# Patient Record
Sex: FEMALE
[De-identification: names, ages, dates, MRNs, and addresses within clinical notes are randomized per-mention and may not be internally consistent; named-entity substitution may affect disease eponyms.]

---

## 2018-08-22 ENCOUNTER — HOSPITAL ENCOUNTER (EMERGENCY)
Dept: HOSPITAL 31 - C.EROB | Age: 28
Discharge: HOME | End: 2018-08-22
Payer: COMMERCIAL

## 2018-08-22 VITALS — BODY MASS INDEX: 29.2 KG/M2

## 2018-08-22 DIAGNOSIS — R10.30: ICD-10-CM

## 2018-08-22 DIAGNOSIS — O26.893: Primary | ICD-10-CM

## 2018-08-22 DIAGNOSIS — Z3A.31: ICD-10-CM

## 2018-08-22 LAB
BILIRUB UR-MCNC: NEGATIVE MG/DL
COLOR UR: (no result)
GLUCOSE UR STRIP-MCNC: NEGATIVE MG/DL
LEUKOCYTE ESTERASE UR-ACNC: NEGATIVE LEU/UL
PH UR STRIP: 6.5 [PH] (ref 5–8)
PROT UR STRIP-MCNC: NEGATIVE MG/DL
RBC # UR STRIP: NEGATIVE /UL
SP GR UR STRIP: < 1.005 (ref 1–1.03)
UROBILINOGEN UR-MCNC: 0.2 MG/DL (ref 0.2–1)

## 2018-08-22 NOTE — OBHP
===========================

Datetime: 2018 13:04

===========================

   

IP Adm Impression:  , intrauterine pregnancy

IP Admit Plan:  Observation/Evaluation

Admit Comment, IP Provider:  27 yo female  with an IUP at 31.4 weeks

   C/O of Suprapubic pains while at work on her feet. Denies Urinary symptoms

   Drinks plenty of water. 

      

Pelvic Type - PN:  Adequate

Extremities - PN:  Normal

Abdomen - PN:  Normal

Back - PN:  Normal

Breast - PN:  Not Done

Lungs - PN:  Normal

Heart - PN:  Normal

Thyroid - PN:  Normal

Neurologic - PN:  Normal

HEENT - PN:  Normal

General - PN:  Normal

Fetal Presentation-Admit:  Vertex

FHR - Baseline A Provider:  130

Membranes, Provider:  Intact

Contraction Comments Provider:  None

Gestation - Est Wks by US:  31.4

EGA AdmitDate IP:  31.4

Vital Signs Provider:  Reviewed

IP Chief Complaint:  Signs/symptoms UTI; Maternal discomfort; Fetal evaluation

NICHD Variability Prov Fetus A:  Moderate 6-25bpm

NICHD Accel Fetus A IP Provider:  10X10

FHR Category Provider Fetus A:  Category I

NICHD Decel Fetus A IP Provider:  None

Dilatation, Provider:  0

Effacement, Provider:  0

Station, Provider:  -3

Genitourinary Exam:  Normal

DTRs - PN:  Normal

## 2018-08-23 VITALS — HEART RATE: 89 BPM | DIASTOLIC BLOOD PRESSURE: 64 MMHG | SYSTOLIC BLOOD PRESSURE: 97 MMHG

## 2018-11-08 ENCOUNTER — HOSPITAL ENCOUNTER (INPATIENT)
Dept: HOSPITAL 31 - C.EROB | Age: 28
LOS: 2 days | Discharge: HOME | DRG: 560 | End: 2018-11-10
Attending: OBSTETRICS & GYNECOLOGY | Admitting: OBSTETRICS & GYNECOLOGY
Payer: MEDICAID

## 2018-11-08 VITALS — BODY MASS INDEX: 29.2 KG/M2

## 2018-11-08 DIAGNOSIS — O48.0: Primary | ICD-10-CM

## 2018-11-08 DIAGNOSIS — D64.9: ICD-10-CM

## 2018-11-08 DIAGNOSIS — O36.63X0: ICD-10-CM

## 2018-11-08 DIAGNOSIS — Z3A.41: ICD-10-CM

## 2018-11-08 DIAGNOSIS — B15.9: ICD-10-CM

## 2018-11-08 LAB
ALBUMIN SERPL-MCNC: 3 G/DL (ref 3.5–5)
ALBUMIN/GLOB SERPL: 1 {RATIO} (ref 1–2.1)
ALT SERPL-CCNC: 35 U/L (ref 9–52)
AST SERPL-CCNC: 31 U/L (ref 14–36)
BACTERIA #/AREA URNS HPF: (no result) /[HPF]
BASOPHILS # BLD AUTO: 0 K/UL (ref 0–0.2)
BASOPHILS NFR BLD: 0.5 % (ref 0–2)
BILIRUB UR-MCNC: NEGATIVE MG/DL
BUN SERPL-MCNC: 8 MG/DL (ref 7–17)
CALCIUM SERPL-MCNC: 8.1 MG/DL (ref 8.6–10.4)
EOSINOPHIL # BLD AUTO: 0.1 K/UL (ref 0–0.7)
EOSINOPHIL NFR BLD: 2.5 % (ref 0–4)
ERYTHROCYTE [DISTWIDTH] IN BLOOD BY AUTOMATED COUNT: 15 % (ref 11.5–14.5)
GFR NON-AFRICAN AMERICAN: > 60
GLUCOSE UR STRIP-MCNC: NORMAL MG/DL
HBV SURFACE AG SERPL QL CFM: (no result)
HEPATITIS A IGM: NEGATIVE
HEPATITIS B CORE AB: NEGATIVE
HEPATITIS C ANTIBODY: NEGATIVE
HGB BLD-MCNC: 11.2 G/DL (ref 11–16)
LEUKOCYTE ESTERASE UR-ACNC: (no result) LEU/UL
LYMPHOCYTES # BLD AUTO: 1.1 K/UL (ref 1–4.3)
LYMPHOCYTES NFR BLD AUTO: 25.6 % (ref 20–40)
MCH RBC QN AUTO: 30.9 PG (ref 27–31)
MCHC RBC AUTO-ENTMCNC: 34.4 G/DL (ref 33–37)
MCV RBC AUTO: 89.9 FL (ref 81–99)
MONOCYTES # BLD: 0.2 K/UL (ref 0–0.8)
MONOCYTES NFR BLD: 5.6 % (ref 0–10)
NEUTROPHILS # BLD: 2.9 K/UL (ref 1.8–7)
NEUTROPHILS NFR BLD AUTO: 65.8 % (ref 50–75)
NRBC BLD AUTO-RTO: 0 % (ref 0–2)
PH UR STRIP: 6 [PH] (ref 5–8)
PLATELET # BLD: 121 K/UL (ref 130–400)
PMV BLD AUTO: 10.5 FL (ref 7.2–11.7)
PROT UR STRIP-MCNC: NEGATIVE MG/DL
RBC # BLD AUTO: 3.63 MIL/UL (ref 3.8–5.2)
RBC # UR STRIP: (no result) /UL
SP GR UR STRIP: 1.01 (ref 1–1.03)
SQUAMOUS EPITHIAL: 32 /HPF (ref 0–5)
UROBILINOGEN UR-MCNC: 4 MG/DL (ref 0.2–1)
WBC # BLD AUTO: 4.4 K/UL (ref 4.8–10.8)

## 2018-11-08 PROCEDURE — 3E0P7VZ INTRODUCTION OF HORMONE INTO FEMALE REPRODUCTIVE, VIA NATURAL OR ARTIFICIAL OPENING: ICD-10-PCS | Performed by: OBSTETRICS & GYNECOLOGY

## 2018-11-08 PROCEDURE — 10907ZC DRAINAGE OF AMNIOTIC FLUID, THERAPEUTIC FROM PRODUCTS OF CONCEPTION, VIA NATURAL OR ARTIFICIAL OPENING: ICD-10-PCS | Performed by: OBSTETRICS & GYNECOLOGY

## 2018-11-08 NOTE — OBADHP
===========================

Datetime: 2018 10:51

===========================

   

Admit Comment, IP Provider:  27 yo female  with an IUP at 41.2 weeks and admitted for IOL for
 postdates. Denies LOF, VB or VD and admits to adequate FM

   Seen at [facility] for weekly BPP's for LGA and postdates. [facility] US for Growth on 10/23 with EFW 9.2 lbs an
d normal JOHANNE/ BPP / on , per report

      

   PMHx + Hep B and A, per prenatals and Varicella non-Immune

   PSHx Denies

   Meds PNV

   NKDA

   Social Hx Negative x 3

   Past OB Hx: 2 's without complications. Last one w 8lbs 9oz at [facility]

      

   A/P

   Posterm pregnancy

   Admitted for IOL

   LGA

   Will admit and start Pitocin for IOL

   GBS Negtive

      

Pelvic Type - PN:  Adequate

Extremities - PN:  Normal

Abdomen - PN:  Normal

Back - PN:  Normal

Breast - PN:  Not Done

Lungs - PN:  Normal

Heart - PN:  Normal

Thyroid - PN:  Normal

Neurologic - PN:  Normal

HEENT - PN:  Normal

General - PN:  Normal

FHR - Baseline A Provider:  130

Membranes, Provider:  Intact

Contraction Comments Provider:  Irregular

Comments, ACOG Physical Exam:  FH 40 cms

      

Gestation - Est Wks by US:  41.2

IP Prenatal Hx Assessment:  The Prenatal History has been Reviewed and is Current

Vital Signs Provider:  Reviewed; Within Normal Limits

IP Chief Complaint:  Uterine contractions; Scheduled induction of labor

NICHD Variability Prov Fetus A:  Moderate 6-25bpm

NICHD Accel Fetus A IP Provider:  10X10

FHR Category Provider Fetus A:  Category I

NICHD Decel Fetus A IP Provider:  None

Dilatation, Provider:  3

Effacement, Provider:  50

Station, Provider:  -3

Genitourinary Exam:  Normal

DTRs - PN:  Normal

EGA AdmitDate IP:  41.2

IP Adm Impression:  Postterm, intrauterine pregnancy; Intact Membranes

IP Admit Plan:  Admit to unit; Initiate labor induction protocol

   

===========================

Datetime: 2018 08:20

===========================

   

Fetal Presentation-Admit:  Vertex

Pool Provider:  Negative

## 2018-11-08 NOTE — OBDS
===================================

DELIVERY PERSONNEL

===================================

   

Delivery Doctor:  BRANDI Mccracken DO

Circulator:  Jessica Sandoval RN

Anesthesiologist:  JOSE FRANCISCO Cleaning MD

   

===================================

MATERNAL INFORMATION

===================================

   

Delivery Anesthesia:  Epidural

Estimated  Blood Loss (ml):  300

Placenta Cultured:  No

Maternal Complications:  None

Provider Comments:   of viable male  from ZUHAIR positiion and over an intact perineum. Apgars
 9_9, BW 11.02 lbs, 4995 grams.

    mls

   Placenta and cord blood sent to lab

   Pt and  both tolerated the procedure well and remained in L_D in S_S condition

   

===================================

LABOR SUMMARY

===================================

   

EDC:  10/30/2018 00:00

No. Babies in Womb:  1

 Attempted:  No

Labor Anesthesia:  Epidural

   

===================================

LABOR INFORMATION

===================================

   

Reason for Induction:  Postterm

Onset of Labor:  2018 08:30

Complete Dilatation:  2018 19:55

Oxytocin:  Augmentation

Steroids Given:  None

Reason Steroids Not Administered:  Not Applicable

   

===================================

MEMBRANES

===================================

   

Membranes Rupture Method:  Artificial

Amniotic Fluid Color:  Clear

Amniotic Fluid Amount:  Moderate

   

===================================

STAGES OF LABOR

===================================

   

Stage 1 hrs:  11

Stage 1 min:  25

Stage 2 hrs:  0

Stage 2 min:  10

Stage 3 hrs:  0

Stage 3 min:  6

Total Time in Labor hrs:  11

Total Time in Labor min:  41

   

===================================

VAGINAL DELIVERY

===================================

   

Episiotomy:  None

Laceration Extension:  N/A

Laceration Type:  None

Laceration Repair:  Not Applicable

Sponge Count Correct:  Yes

Sharps Count Correct:  Yes

   

===================================

BABY A INFORMATION

===================================

   

Infant Delivery Date/Time:  2018 20:05

Method of Delivery:  Vaginal

Born in Route :  No

:  N/A

Forceps:  N/A

Vacuum Extraction:  N/A

Shoulder Dystocia :  No

   

===================================

SHOULDER DYSTOCIA BABY A

===================================

   

Infant Delivery Date/Time:  2018 20:05

   

===================================

PRESENTATION/POSITION BABY A

===================================

   

Presentation:  Cephalic

Cephalic Presentation:  Vertex

Vertex Position:  Right Occipital Anterior

Breech Presentation:  N/A

   

===================================

PLACENTA INFORMATION BABY A

===================================

   

Placenta Delivery Time :  2018 20:11

Placenta Method of Delivery:  Spontaneous

Placenta Status:  Delivered

   

===================================

APGAR SCORES BABY A

===================================

   

Heart Rate 1 min:  >100 bpm

Resp Effort 1 min:  Good Cry

Reflex Irritability 1 min:  Cough or Sneeze or Pulls Away

Muscle Tone 1 min:  Active Motion

Color 1 min:  Body Pink, Extremities Blue

Resuscitation Effort 1 min:  Tactile Stimulation

APGAR SCORE 1 MIN:  9

Heart Rate 5 min:  >100 bpm

Resp Effort 5 min:  Good Cry

Reflex Irritability 5 min:  Cough or Sneeze or Pulls Away

Muscle Tone 5 min:  Active Motion

Color 5 min:  Completely Pink

Resuscitation Effort 5 min:  N/A

APGAR SCORE 5 MIN:  10

   

===================================

INFANT INFORMATION BABY A

===================================

   

Gestational Age at Delivery:  41.2

Gestational Status:  Term

Infant Outcome :  Liveborn

Infant Condition :  STABLE

Infant Sex:  Male

   

===================================

IDENTIFICATION/MEDS BABY A

===================================

   

ID Band Number:  [id number]

ID Band Location:  Left Leg; Left Arm

Sensor Applied:  Yes

Sensor Location :  Cord Clamp

   

===================================

WEIGHT/LENGTH BABY A

===================================

   

Infant Birthweight (gms):  4995

Infant Weight (lb):  11

Infant Weight (oz):  0

Infant Length Inches:  22.00

Infant Length cms:  55.9

   

===================================

CORD INFORMATION BABY A

===================================

   

No. Cord Vessels:  3

Nuchal Cord :  N/A

## 2018-11-08 NOTE — OBPN
===========================

Datetime: 11/08/2018 15:48

===========================

   

IP Progress Impression:  Normal progression of labor; Reassuring fetal heart rate

IP Informed Consent Obtain:  Vaginal Delivery

IP Procedures:  Artificial ROM; Sterile Vag Exam

IP Progress Plan:  Continue present management; Induction

Membranes, Provider:  Ruptured

Amniotic Fluid Color, Provider:  Clear

Contraction Comments Provider:  2-3 mins

FHR - Baseline A Provider:  130

Gestation - Est Wks by US:  41.2

Fetal Presentation-Admit:  Vertex

IP Progress Note Comment:  Pt seen and examined

   Epidural in place and working well. Pt very comfortable

   AROM performed with return of clear fluid

   Will restart Ptocin

   All admit labs reviewed and Essentially WNL

   Anticipate a vaginal delivery

   Fetal tracing reassuring

Vital Signs Provider:  Reviewed; Within Normal Limits

NICHD Accel Fetus A IP Provider:  10X10

NICHD Variability Prov Fetus A:  Moderate 6-25bpm

Dilatation, Provider:  5

Effacement, Provider:  75

Station, Provider:  -3

NICHD Decel Fetus A IP Provider:  None

   

===========================

Datetime: 11/08/2018 10:51

===========================

   

FHR Category Provider Fetus A:  Category I

   

===========================

Datetime: 11/08/2018 08:20

===========================

   

Pool Provider:  Negative

## 2018-11-09 LAB
BASOPHILS # BLD AUTO: 0 K/UL (ref 0–0.2)
BASOPHILS NFR BLD: 0.4 % (ref 0–2)
EOSINOPHIL # BLD AUTO: 0 K/UL (ref 0–0.7)
EOSINOPHIL NFR BLD: 0.2 % (ref 0–4)
ERYTHROCYTE [DISTWIDTH] IN BLOOD BY AUTOMATED COUNT: 14.8 % (ref 11.5–14.5)
HGB BLD-MCNC: 11 G/DL (ref 11–16)
LYMPHOCYTES # BLD AUTO: 1.2 K/UL (ref 1–4.3)
LYMPHOCYTES NFR BLD AUTO: 10.6 % (ref 20–40)
MCH RBC QN AUTO: 30.5 PG (ref 27–31)
MCHC RBC AUTO-ENTMCNC: 34.1 G/DL (ref 33–37)
MCV RBC AUTO: 89.5 FL (ref 81–99)
MONOCYTES # BLD: 0.6 K/UL (ref 0–0.8)
MONOCYTES NFR BLD: 5.1 % (ref 0–10)
NEUTROPHILS # BLD: 9.7 K/UL (ref 1.8–7)
NEUTROPHILS NFR BLD AUTO: 83.7 % (ref 50–75)
NRBC BLD AUTO-RTO: 0 % (ref 0–2)
PLATELET # BLD: 120 K/UL (ref 130–400)
PMV BLD AUTO: 10.3 FL (ref 7.2–11.7)
RBC # BLD AUTO: 3.6 MIL/UL (ref 3.8–5.2)
WBC # BLD AUTO: 11.6 K/UL (ref 4.8–10.8)

## 2018-11-09 RX ADMIN — Medication SCH TAB: at 10:01

## 2018-11-09 NOTE — OBPPN
===========================

Datetime: 2018 07:20

===========================

   

PP Pain Prov:  Within normal limits

PP Nausea Prov:  Denies

PP Flatus Prov:  No

PP BM Prov:  No

PP Heart Prov:  Normal

PP Lungs Prov:  Normal

PP Abdomen/Uterus Prov:  Normal

PP Lochia Prov:  Normal

PP Vulva/Perineum Prov:  Normal

PP CVA Tenderness Prov:  Normal

PP Extremities Prov:  Normal

PP C/S Incision Prov:  Not Applicable

PP Breastfeeding Progress Prov:  Normal

PP Comments Phys Exam Prov:  Gen: patient in bed in NAD

   Heart: S1, S2, RRR

   Chest: CTA b/l. No wheezing

   Abd: soft, +BS, appropriate tender to palpate, surgical incision clean, no erythema, no swelling, 
no discahrge. Fundal height at one finger above the level of umbilicus

   Ext: peripheral pulses palpable, no edema, no cyanosis

PP Impression Prov:  Normal postpartum progression

PP Plan Prov:  Continue present management

PP Progress Note Prov:  Patient seen and examined at bedside. 

   29 y/o s/p  at 41.2 GA. Gave birth to 11.02 LB male 

   Tolerating her regular diet. Ambulating in her room. Moderate abdominal pain but did not ask for p
ain med. Vaginal bleeding moderate. Denied fever, chills, chest pain, SOB, nausea, vomiting. Patient 
is breast feeding but uses formula sometimes. Vitals are stable. Baby is doing well.  

      

   A/P:

   29 y/o s/p  at 41.2 GA

      

   Normal PP progression

   Stable and Satisfactory condition and Recovery

   Advance care

   Breastfeeding counseling

   Encourage Incentive spirometry

   Pain management, encourage ambulation and hydration

      

      

   Case reviewed and discussed with attending Dr Darius Locke DO, PGY1

      

   Pt seen and examined at the bedside with residents/medical students. Pt doing well. Anticipate dis
charge home 11/10/18.

      

   DEBBI Mccoy D.O.

IP PP Procedures:  None

Vital Signs Provider PP:  Reviewed; Within Normal Limits

## 2018-11-10 VITALS — OXYGEN SATURATION: 100 % | TEMPERATURE: 98 F | HEART RATE: 58 BPM

## 2018-11-10 VITALS — SYSTOLIC BLOOD PRESSURE: 105 MMHG | DIASTOLIC BLOOD PRESSURE: 72 MMHG

## 2018-11-10 VITALS — RESPIRATION RATE: 20 BRPM

## 2018-11-10 RX ADMIN — Medication SCH TAB: at 09:49

## 2018-11-10 NOTE — OBPPN
===========================

Datetime: 11/10/2018 11:15

===========================

   

PP Pain Prov:  Within normal limits

PP Nausea Prov:  Denies

PP Breasts Prov:  Not Done

PP Heart Prov:  Normal

PP Lungs Prov:  Normal

PP Abdomen/Uterus Prov:  Normal

PP Lochia Prov:  Normal

PP Vulva/Perineum Prov:  Normal

PP CVA Tenderness Prov:  Normal

PP Extremities Prov:  Normal

PP C/S Incision Prov:  Not Applicable

PP Breastfeeding Progress Prov:  Abnormal

PP Comments Phys Exam Prov:  Fundus below Umbilicus and non-tender

PP Impression Prov:  Normal postpartum progression; Breastfeeding difficulties

PP Plan Prov:  Continue present management

PP Progress Note Prov:  PPD # 2

   S/P  of viable Male 

   Does not want circumcision and will show how to pull back the forskin for Hygiene

   VSS Afebrirle

   Eating, voiding and ambulating OK

   Will Discharge home later today. Baby under lights

IP PP Procedures:  None

Vital Signs Provider PP:  Reviewed; Within Normal Limits

## 2018-11-10 NOTE — OBDCSUM
===========================

Datetime: 11/10/2018 13:25

===========================

   

Discharged to, Provider:  Home

Follow up at, Provider:  Clinic

Disch Instr Activity:  Normal activity

Disch Instr Diet:  Regular

Discharge Instructions, Provider:  Routine instructions given

Discharge Diagnosis, Provider:  Term Pregnancy Delivered; Postterm Pregnancy

Discharge Time:  11/10/2018 14:00

Follow up in weeks, Provider:  4-6 weeks or prn

Disch Referrals:  None

Contraception discussed, Prov:  Yes

Disch Activity Restrictions:  No exercising; No lifting; Minimize stair-climbing; No sexual activity;
 Nothing in vagina - [place], tampons, douche

Discharge Comment, Provider:  PPD # 2

   S/P  of viable Male 

   Does not want circumcision and will show how to pull back the forskin for Hygiene

   VSS Afebrirle

   Eating, voiding and ambulating OK

   Will Discharge home with instructions and Rx for motrin for pain

   Advised to increase po water intake and fiber in diet

   Continue pelvic rest x 6-8 weeks

Discharge Diagnosis Prov Other:  + Hep A_B

   Mild Acute Anemia     Asymptomatic

Contraception after Delivery:  Undecided